# Patient Record
Sex: MALE | Race: OTHER | NOT HISPANIC OR LATINO | URBAN - METROPOLITAN AREA
[De-identification: names, ages, dates, MRNs, and addresses within clinical notes are randomized per-mention and may not be internally consistent; named-entity substitution may affect disease eponyms.]

---

## 2019-05-22 ENCOUNTER — EMERGENCY (EMERGENCY)
Facility: HOSPITAL | Age: 39
LOS: 1 days | Discharge: HOME | End: 2019-05-22
Attending: EMERGENCY MEDICINE | Admitting: EMERGENCY MEDICINE
Payer: SELF-PAY

## 2019-05-22 VITALS
DIASTOLIC BLOOD PRESSURE: 62 MMHG | OXYGEN SATURATION: 96 % | RESPIRATION RATE: 18 BRPM | SYSTOLIC BLOOD PRESSURE: 93 MMHG | HEART RATE: 63 BPM | WEIGHT: 179.9 LBS | TEMPERATURE: 98 F

## 2019-05-22 VITALS — WEIGHT: 179.9 LBS

## 2019-05-22 PROCEDURE — 99283 EMERGENCY DEPT VISIT LOW MDM: CPT

## 2019-05-22 NOTE — ED ADULT TRIAGE NOTE - CHIEF COMPLAINT QUOTE
pt was found sleeping standing up- admits to k2 use and his normal dose if 140mg of methadone today-

## 2019-05-22 NOTE — ED ADULT NURSE NOTE - OBJECTIVE STATEMENT
38 y/o male found sleeping while standing up- pt admits to taking his usual 140mg of methadone and using k2- pt is A+Ox3 with no medical complaints

## 2019-05-22 NOTE — ED PROVIDER NOTE - NSFOLLOWUPINSTRUCTIONS_ED_ALL_ED_FT
Please refrain from illicit drugs.  Please see your doctor or call [facility] for an appointment this week for primary care.

## 2019-05-22 NOTE — ED ADULT NURSE NOTE - CHPI ED NUR SYMPTOMS NEG
no tingling/no weakness/no chills/no fever/no pain/no vomiting/no nausea/no decreased eating/drinking/no dizziness

## 2019-05-22 NOTE — ED ADULT NURSE NOTE - NSIMPLEMENTINTERV_GEN_ALL_ED
Implemented All Universal Safety Interventions:  [place] to call system. Call bell, personal items and telephone within reach. Instruct patient to call for assistance. Room bathroom lighting operational. Non-slip footwear when patient is off stretcher. Physically safe environment: no spills, clutter or unnecessary equipment. Stretcher in lowest position, wheels locked, appropriate side rails in place. .

## 2019-05-22 NOTE — ED PROVIDER NOTE - CLINICAL SUMMARY MEDICAL DECISION MAKING FREE TEXT BOX
K2 and methadone altered mental status in field as per EMS, now awake and alert with steady gait and oriented to person, place and time of day and wishes to leave ED, offered SBIRT and rehab and pt declines.

## 2019-05-26 DIAGNOSIS — F17.200 NICOTINE DEPENDENCE, UNSPECIFIED, UNCOMPLICATED: ICD-10-CM

## 2019-05-26 DIAGNOSIS — R41.82 ALTERED MENTAL STATUS, UNSPECIFIED: ICD-10-CM

## 2019-05-26 DIAGNOSIS — F12.10 CANNABIS ABUSE, UNCOMPLICATED: ICD-10-CM

## 2019-05-26 DIAGNOSIS — F11.10 OPIOID ABUSE, UNCOMPLICATED: ICD-10-CM

## 2020-12-08 NOTE — ED PROVIDER NOTE - NSTIMEPROVIDERCAREINITIATE_GEN_ER
Normal vision: sees adequately in most situations; can see medication labels, newsprint
22-May-2019 15:25

## 2021-02-16 NOTE — ED ADULT NURSE NOTE - NSSUSCREENINGQ1_ED_ALL_ED
Min Leone, nurse , callingin regards to patient's procedure with Dr. Chasity Combs. States that she sees in the chart that it was said no authorization was required, but wants to know if authorization had been sent. States Yakelin Damian denied it. Also wants to know if it had been paid.      Phone: [phone]
Returned call, no answer. Voice mail message identifies Tj Goff by full name. Message left with phone # to hospital business office [phone] and to the [facility] [phone]. Patient had leadless pacemaker place by Dr. Brian Hartmann on 11/17/20.
No
